# Patient Record
Sex: MALE | Race: WHITE
[De-identification: names, ages, dates, MRNs, and addresses within clinical notes are randomized per-mention and may not be internally consistent; named-entity substitution may affect disease eponyms.]

---

## 2019-07-26 ENCOUNTER — HOSPITAL ENCOUNTER (OUTPATIENT)
Dept: HOSPITAL 9 - MADRAD | Age: 54
Discharge: HOME | End: 2019-07-26
Payer: COMMERCIAL

## 2019-07-26 DIAGNOSIS — Z00.00: Primary | ICD-10-CM

## 2019-07-26 PROCEDURE — 71046 X-RAY EXAM CHEST 2 VIEWS: CPT

## 2023-03-28 NOTE — RAD
PA AND LATERAL CHEST:

 

HISTORY: 

Wellness exam.

 

FINDINGS: 

Heart size is within normal limits.  The lungs are clear of any infiltrates.  There is a calcified gr
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  There are minimal arthritic changes of the spine.

 

IMPRESSION: 

No active intrathoracic disease.

 

POS: C
Detail Level: Detailed